# Patient Record
(demographics unavailable — no encounter records)

---

## 2018-03-04 NOTE — ED PHYSICIAN DOCUMENTATION
PD HPI MALE 





- Stated complaint


Stated Complaint: MALE 





- Chief complaint


Chief Complaint: Abd Pain





- History obtained from


History obtained from: Patient





- History of Present Illness


Timing - onset: Yesterday


Timing - details: Gradual onset, Still present


Associated symptoms: Unable to urinate, Hematuria


Similar symptoms before: Has not had sx before


Recently seen: Not recently seen





- Additional information


Additional information: 





Patient is an 86 year old male with a history of prostate CA over 10 years ago 

who is presenting to the emergency department for urinary retention.  Patient 

states that since yesterday he has not been able to fully urinate.  He has had 

some dribble and has had some drops of blood but he has not been able to empty 

his bladder.  





Review of Systems


Constitutional: denies: Fever, Chills


Eyes: reports: Reviewed and negative


Ears: reports: Reviewed and negative


Nose: reports: Reviewed and negative


Throat: reports: Reviewed and negative


Cardiac: reports: Reviewed and negative


GI: reports: Abdominal Pain


: reports: Unable to Void, Hematuria


Skin: denies: Rash, Lesions


Musculoskeletal: reports: Reviewed and negative


Neurologic: reports: Reviewed and negative


Immunocompromised: denies: Immunocompromised





PD PAST MEDICAL HISTORY





- Past Medical History


Past Medical History: Yes


GI: GERD


: Other


Other Past Medical History: Prostate CA





- Past Surgical History


Past Surgical History: Yes





- Present Medications


Home Medications: 


 Ambulatory Orders











 Medication  Instructions  Recorded  Confirmed


 


Hydrochlorothiazide 1 tab PO DAILY 03/04/18 


 


Omeprazole 1 cap PO DAILY 03/04/18 














- Allergies


Allergies/Adverse Reactions: 


 Allergies











Allergy/AdvReac Type Severity Reaction Status Date / Time


 


No Known Drug Allergies Allergy   Verified 03/04/18 03:22














- Social History


Does the pt smoke?: No


Smoking Status: Never smoker


Does the pt drink ETOH?: No


Does the pt have substance abuse?: No





- Immunizations


Immunizations are current?: Yes





- POLST


Patient has POLST: No





PD ED PE NORMAL





- HEENT


HEENT: Atraumatic





- Cardiac


Cardiac: RRR, No murmur





- Respiratory


Respiratory: No respiratory distress





- Derm


Derm: Normal color, Warm and dry, No rash





- Extremities


Extremities: No deformity





- Neuro


Neuro: Alert and oriented X 3, No motor deficit, No sensory deficit, Normal 

speech


Eye Opening: Spontaneous





PD ED PE EXPANDED





- General


General: Alert





- HEENT


HEENT: Dry mucous membranes





- Abdomen


Abdomen: Distended, Tender to palpation, Suprapubic





Results





- Vitals


Vitals: 


 Vital Signs - 24 hr











  03/04/18





  03:10


 


Temperature 36.5 C


 


Heart Rate 84


 


Respiratory 18





Rate 


 


Blood Pressure 210/92 H


 


O2 Saturation 98








 Oxygen











O2 Source                      Room air

















- Labs


Labs: 


 Laboratory Tests











  03/04/18





  03:34


 


Urine Color  YELLOW


 


Urine Clarity  HAZY


 


Urine pH  5.5


 


Ur Specific Gravity  >=1.030 H


 


Urine Protein  100 H


 


Urine Glucose (UA)  NEGATIVE


 


Urine Ketones  TRACE


 


Urine Occult Blood  LARGE H


 


Urine Nitrite  NEGATIVE


 


Urine Bilirubin  NEGATIVE


 


Urine Urobilinogen  0.2 (NORMAL)


 


Ur Leukocyte Esterase  NEGATIVE


 


Urine RBC  TNTC H


 


Urine WBC  0-3


 


Ur Squamous Epith Cells  FEW Squamous


 


Urine Bacteria  Few


 


Urine Casts  3-5 Course Granular


 


Ur Microscopic Review  INDICATED


 


Urine Culture Comments  NOT INDICATED














PD MEDICAL DECISION MAKING





- ED course


Complexity details: reviewed old records, reviewed results, re-evaluated patient

, considered differential, d/w patient


ED course: 





Patient was seen and examined at bedside.  bladder scanner was performed and 

shoed that the patient had urinary retention.  Catheter was placed a few clots 

were removed.  about 500ml of urine came out.  Urine was sent.  Patient's 

bladder was flushed with 250 ml of saline and it was draining clear.  patient's 

urine showed hematuria but did not clearly indicate infection.  Patient was 

educated on his leg bag.  Patient required no further work up and was stable 

for discharge with outpatient follow up.  





Departure





- Departure


Disposition: 01 Home, Self Care


Clinical Impression: 


 Acute urinary retention





Condition: Good


Instructions:  ED Retention Urinary Male


Follow-Up: 


[facility] [Provider Group]


Jose Antonio Hernandez MD [Primary Care Provider] - Within 3 Days


Comments: 


Your symptoms today were being caused by urinary retention secondary to a clot 

in your urethra.  You will need to see your doctor on monday and have him/her 

remove the catheter.  You should also call the urology group to schedule a 

follow up appointment.  You may return to the emergency department at any time 

for new, worsening or uncontrollable symptoms.

## 2018-05-07 NOTE — CT REPORT
CT IVP:  05/07/2018

 

CLINICAL INDICATION:  Gross hematuria.

 

TECHNIQUE: Axial CT images of the abdomen and pelvis were obtained prior to and following 100 

mL Isovue 300 intravenously, using split bolus technique.

 

COMPARISON:  No previous CT is available for comparison.

 

FINDINGS:  Limited evaluation of the lung bases is unremarkable.  A moderate hiatal hernia is 

incidentally noted.

 

ABDOMEN:  On the unenhanced images, there is no evidence of nephrolithiasis or hydronephrosis. 

The kidneys demonstrate symmetric uptake and excretion of contrast.  Cortical cysts are 

present.  No solid renal mass or hydronephrosis is present.  The liver, spleen, pancreas and 

adrenal glands are unremarkable.  The gallbladder is not dilated.  No bowel dilatation, free 

gas, or free fluid is present.  No abdominal adenopathy is seen.

 

PELVIS:  The distal ureters are unremarkable.  Brachytherapy seeds are noted in the prostate 

bed.  No pelvic adenopathy or free fluid is present.  The appendix is seen in the right lower 

quadrant and is normal in caliber.

 

Osseous structures demonstrate degenerative changes.

 

IMPRESSION:

1.  NO EVIDENT ETIOLOGY FOR THE PATIENT'S HEMATURIA.  CONSIDER CORRELATION WITH CYSTOSCOPY.

2.  MODERATE HIATAL HERNIA.

 

CT DOSE REDUCTION STATEMENT

 

In accordance with CT protocol optimization, one or more of the following dose reduction 

techniques were utilized for this exam:  automated exposure control, adjustment of mA and/or KV

based on patient size, or use of iterative reconstructive technique.

 

 

DD: 05/07/2018 12:07

TD: 05/07/2018 12:23

Job #: [id number]

## 2019-08-29 NOTE — CT REPORT
Reason:  HISTORY OF BLADDER CANCER

Procedure Date:  08/28/2019   

Accession Number:  [id number] / [id number]                    

Procedure:  CT  - IVP CPT Code:  

 

FULL RESULT:

 

 

EXAM:

CT ABDOMEN AND PELVIS WITHOUT AND WITH CONTRAST (CT IVP)

 

EXAM DATE: 8/28/2019 10:05 AM.

 

CLINICAL HISTORY: History of bladder cancer.

 

COMPARISONS: IVP 05/07/2018 9:36 AM.

 

TECHNIQUE: Routine helical imaging was performed through the kidneys, 

ureters and bladder in the precontrast, postcontrast and delayed phase. 

IV Contrast: OPTI 320 100 mL. Reconstructions: Coronal and sagittal.

 

In accordance with CT protocol optimization, one or more of the following 

dose reduction techniques were utilized for this exam: automated exposure 

control, adjustment of mA and/or KV based on patient size, or use of 

iterative reconstructive technique.

 

FINDINGS:

Lung Bases: There is a 1 cm left lung nodule, not significantly changed 

as visualized. Both lung bases demonstrate dependent changes.

 

Liver: Hypoattenuating consistent with hepatic steatosis.

 

Gallbladder/Bile Ducts: Unremarkable.

 

Spleen: Normal.

 

Pancreas: Normal.

 

Adrenal Glands: Normal.

 

Kidneys/Bladder:

Right Kidney/Ureter: No renal or ureteral stones. No hydronephrosis or 

hydroureter. Renal hypodensities too small to characterize are identified.

 

Left Kidney/Ureter: No renal or ureteral stones. No hydronephrosis or 

hydroureter. Both an exophytic 8 x 6.5 cm renal cyst and a 1.8 x 1.8 cm 

renal cyst measuring greater than simple fluid by CT criteria; these have 

not significantly changed compared to 2018. Additional renal 

hypodensities are too small to characterize.

 

Bladder: No stones. No wall thickening or mass with change of the bladder 

base contour presumably related to the prostate discussed below.

 

Peritoneal Cavity/Bowel: Normal. No free fluid, free air or adenopathy. 

No masses or acute inflammatory process.

 

Pelvic Organs: Prostate is again noted to contain fiducial markers and 

hypertrophied with dome of the prostate indenting the base of the bladder 

in a configuration similar to before.

 

Vasculature: No aneurysms or other significant abnormality.

 

Bones: No definite aggressive osseous lesions.

 

Other: None.

IMPRESSION: Recommend renal ultrasound to determine whether the left 

renal cysts can be characterized as simple.

 

Stable CT appearance of bladder and prostate.

 

[facility]

## 2019-10-31 NOTE — ED PHYSICIAN DOCUMENTATION
PD HPI MALE 





- Stated complaint


Stated Complaint: CATH REMOVAL





- Chief complaint


Chief Complaint: General





- History obtained from


History obtained from: Patient, Family





- History of Present Illness


Timing - onset: Today


Timing - duration: Hours


Timing - details: Abrupt onset, Still present in ED


Associated symptoms: Hemphill problem


Similar symptoms before: Has not had sx before


Recently seen: Surgery





- Additional information


Additional information: 





87-year-old male who has a history of bladder cancer has been undergoing routine

repeat cystoscopy and he has had cystoscopy done about 3 days ago he has had a 

catheter in place since then and he was getting ready to remove the catheter 

himself under the instruction of his urologist when he discovered that he was 

unable to do this.  It appears he was using the wrong port to attempt to deflate

the balloon.  He is here now for us to remove the catheter for him.





Review of Systems


Constitutional: denies: Fever


Eyes: denies: Decreased vision


Ears: denies: Ear pain


Nose: denies: Congestion


Throat: denies: Sore throat


Respiratory: denies: Cough


GI: denies: Nausea, Vomiting


: reports: Hemphill Problem.  denies: Dysuria, Frequency





PD PAST MEDICAL HISTORY





- Past Medical History


GI: GERD


: Other





- Past Surgical History


Past Surgical History: Yes





- Present Medications


Home Medications: 


                                Ambulatory Orders











 Medication  Instructions  Recorded  Confirmed


 


Hydrochlorothiazide 1 tab PO DAILY 03/04/18 


 


Omeprazole 1 cap PO DAILY 03/04/18 














- Allergies


Allergies/Adverse Reactions: 


                                    Allergies











Allergy/AdvReac Type Severity Reaction Status Date / Time


 


No Known Drug Allergies Allergy   Verified 10/31/19 12:58














- Social History


Does the pt smoke?: No


Smoking Status: Never smoker


Does the pt drink ETOH?: No


Does the pt have substance abuse?: No





- Immunizations


Immunizations are current?: Yes





- POLST


Patient has POLST: No





PD ED PE NORMAL





- Vitals


Vital signs reviewed: Yes (hypertensive )





- General


General: No acute distress, Well developed/nourished





- HEENT


HEENT: Atraumatic, PERRL, EOMI





- Respiratory


Respiratory: No respiratory distress





- Derm


Derm: Normal color, Warm and dry, No rash





- Extremities


Extremities: No deformity, No edema





- Neuro


Neuro: Alert and oriented X 3, CNs 2-12 intact, No motor deficit, No sensory 

deficit, Normal speech


Eye Opening: Spontaneous


Motor: Obeys Commands


Verbal: Oriented


GCS Score: 15





- Psych


Psych: Normal mood, Normal affect





Results





- Vitals


Vitals: 


                               Vital Signs - 24 hr











  10/31/19





  12:58


 


Temperature 36.8 C


 


Heart Rate 89


 


Respiratory 17





Rate 


 


Blood Pressure 144/72 H


 


O2 Saturation 98








                                     Oxygen











O2 Source                      Room air

















PD MEDICAL DECISION MAKING





- ED course


Complexity details: considered differential, d/w patient, d/w family


ED course: 





87-year-old male with indwelling Hemphill catheter has used the wrong port to 

attempt to deflate the balloon and the RN is able to remove the catheter.





Departure





- Departure


Disposition: 01 Home, Self Care


Clinical Impression: 


 Encounter for Hemphill catheter removal





Condition: Stable


Instructions:  ED Catheter Care Hemphill


Follow-Up: 


Hue Pappas MD [Physician No Access] -